# Patient Record
(demographics unavailable — no encounter records)

---

## 2020-01-01 NOTE — NUR
DISCHARGE

DISCHARGE INSTRUCTIONS EXPLAINED TO THE MOTHER - ID BAND/NAME VERIFIED - ONE BAND WAS 
REMOVED FROM THE BABY & SECURED TO THE  IDENTIFICATION SHEET - THE FOLLOW UP 
APPOINTMENT WAS EXPLAINED ON 2020 IN AM WITH  - THE MOTHER VERBALIZED 
UNDERSTANDING - THE [facility] INFO & BREASTFEEDING GUIDE WAS EXPLAINED - 
FORMULA PREPARATION WAS REVIEWED - W.I.C. PRESCRIPTION GIVEN - JAUNDICE IN THE  
EXPLAINED - THE  DISCHARGE INSTRUCTION SHEET WAS REVIEWED & DISCUSSED - ALL OF THE 
MOTHER'S QUESTIONS WERE ANSWERED - SHE VERBALIZED UNDERSTANDING.

## 2020-01-01 NOTE — NUR
Ms Amaya, PCP of post partum night came to the nursery saying that the mom in room 113 Mrs. Cantu is upset because they had been calling for formula.Since I'm on my way to see and 
assess the baby, just waiting for F. Into to come back to the nursery. So I amalia to to see 
Mrs. Cantu right away telling her that we didn't receive any call coming from her. She said 
that she didn't call the nursery, she called post partum. As I explained to her the benefit 
of  breastfeeding to the baby, how this work. still she insisted to give the bottle, now the 
mom decide to do breast and bottle. I asked her if she understood what I was telling her and 
if she any questions, she said she understand and don't have any questions.